# Patient Record
Sex: MALE | Race: BLACK OR AFRICAN AMERICAN | NOT HISPANIC OR LATINO | Employment: FULL TIME | URBAN - METROPOLITAN AREA
[De-identification: names, ages, dates, MRNs, and addresses within clinical notes are randomized per-mention and may not be internally consistent; named-entity substitution may affect disease eponyms.]

---

## 2023-02-23 PROBLEM — R03.0 ELEVATED BLOOD PRESSURE READING: Status: ACTIVE | Noted: 2023-02-23

## 2023-02-23 PROBLEM — E66.3 OVERWEIGHT WITH BODY MASS INDEX (BMI) OF 28 TO 28.9 IN ADULT: Status: ACTIVE | Noted: 2023-02-23

## 2023-02-23 PROBLEM — R21 RASH OF HANDS: Status: ACTIVE | Noted: 2023-02-23

## 2023-02-23 PROBLEM — F17.200 NICOTINE DEPENDENCE: Status: ACTIVE | Noted: 2023-02-23

## 2023-02-23 PROBLEM — E78.5 HYPERLIPIDEMIA: Status: ACTIVE | Noted: 2023-02-23

## 2023-02-23 PROBLEM — I10 BENIGN HYPERTENSION: Status: ACTIVE | Noted: 2023-02-23

## 2023-02-23 PROBLEM — L40.9 PSORIASIS: Status: ACTIVE | Noted: 2023-02-23

## 2023-02-23 RX ORDER — AMLODIPINE BESYLATE 5 MG/1
1 TABLET ORAL DAILY
COMMUNITY

## 2023-02-23 RX ORDER — NICOTINE POLACRILEX 4 MG/1
1 LOZENGE ORAL
COMMUNITY

## 2023-02-23 RX ORDER — ATORVASTATIN CALCIUM 40 MG/1
1 TABLET, FILM COATED ORAL NIGHTLY
COMMUNITY
Start: 2021-09-03

## 2023-02-23 RX ORDER — TRIAMCINOLONE ACETONIDE OINTMENT USP, 0.05% 0.5 MG/G
OINTMENT TOPICAL 2 TIMES DAILY
COMMUNITY
Start: 2022-09-30

## 2023-04-07 ENCOUNTER — APPOINTMENT (OUTPATIENT)
Dept: PRIMARY CARE | Facility: CLINIC | Age: 58
End: 2023-04-07
Payer: COMMERCIAL

## 2024-10-14 ENCOUNTER — LAB (OUTPATIENT)
Dept: LAB | Facility: LAB | Age: 59
End: 2024-10-14
Payer: COMMERCIAL

## 2024-10-14 ENCOUNTER — APPOINTMENT (OUTPATIENT)
Dept: PRIMARY CARE | Facility: CLINIC | Age: 59
End: 2024-10-14
Payer: COMMERCIAL

## 2024-10-14 VITALS
SYSTOLIC BLOOD PRESSURE: 131 MMHG | OXYGEN SATURATION: 95 % | TEMPERATURE: 98.1 F | DIASTOLIC BLOOD PRESSURE: 84 MMHG | RESPIRATION RATE: 18 BRPM | WEIGHT: 228.9 LBS | BODY MASS INDEX: 30.34 KG/M2 | HEIGHT: 73 IN | HEART RATE: 67 BPM

## 2024-10-14 DIAGNOSIS — E66.811 CLASS 1 OBESITY WITHOUT SERIOUS COMORBIDITY WITH BODY MASS INDEX (BMI) OF 30.0 TO 30.9 IN ADULT, UNSPECIFIED OBESITY TYPE: ICD-10-CM

## 2024-10-14 DIAGNOSIS — I10 BENIGN HYPERTENSION: ICD-10-CM

## 2024-10-14 DIAGNOSIS — Z78.9 ALCOHOL USE: ICD-10-CM

## 2024-10-14 DIAGNOSIS — Z23 ENCOUNTER FOR IMMUNIZATION: Primary | ICD-10-CM

## 2024-10-14 DIAGNOSIS — Z00.00 HEALTHCARE MAINTENANCE: ICD-10-CM

## 2024-10-14 DIAGNOSIS — B00.9 HSV-2 (HERPES SIMPLEX VIRUS 2) INFECTION: ICD-10-CM

## 2024-10-14 LAB
25(OH)D3 SERPL-MCNC: 6 NG/ML (ref 30–100)
ALBUMIN SERPL BCP-MCNC: 4.6 G/DL (ref 3.4–5)
ALP SERPL-CCNC: 64 U/L (ref 33–120)
ALT SERPL W P-5'-P-CCNC: 20 U/L (ref 10–52)
ANION GAP SERPL CALC-SCNC: 12 MMOL/L (ref 10–20)
AST SERPL W P-5'-P-CCNC: 18 U/L (ref 9–39)
BASOPHILS # BLD AUTO: 0.06 X10*3/UL (ref 0–0.1)
BASOPHILS NFR BLD AUTO: 0.6 %
BILIRUB SERPL-MCNC: 0.7 MG/DL (ref 0–1.2)
BUN SERPL-MCNC: 20 MG/DL (ref 6–23)
CALCIUM SERPL-MCNC: 9.2 MG/DL (ref 8.6–10.6)
CHLORIDE SERPL-SCNC: 103 MMOL/L (ref 98–107)
CHOLEST SERPL-MCNC: 254 MG/DL (ref 0–199)
CHOLESTEROL/HDL RATIO: 4.4
CO2 SERPL-SCNC: 27 MMOL/L (ref 21–32)
CREAT SERPL-MCNC: 1.13 MG/DL (ref 0.5–1.3)
EGFRCR SERPLBLD CKD-EPI 2021: 75 ML/MIN/1.73M*2
EOSINOPHIL # BLD AUTO: 0.18 X10*3/UL (ref 0–0.7)
EOSINOPHIL NFR BLD AUTO: 1.9 %
ERYTHROCYTE [DISTWIDTH] IN BLOOD BY AUTOMATED COUNT: 12.9 % (ref 11.5–14.5)
EST. AVERAGE GLUCOSE BLD GHB EST-MCNC: 108 MG/DL
FOLATE SERPL-MCNC: 17.5 NG/ML
GGT SERPL-CCNC: 23 U/L (ref 5–64)
GLUCOSE SERPL-MCNC: 86 MG/DL (ref 74–99)
HBA1C MFR BLD: 5.4 %
HBV SURFACE AB SER-ACNC: <3.1 MIU/ML
HBV SURFACE AG SERPL QL IA: NONREACTIVE
HCT VFR BLD AUTO: 47.2 % (ref 41–52)
HDLC SERPL-MCNC: 57.4 MG/DL
HGB BLD-MCNC: 15.7 G/DL (ref 13.5–17.5)
IMM GRANULOCYTES # BLD AUTO: 0.03 X10*3/UL (ref 0–0.7)
IMM GRANULOCYTES NFR BLD AUTO: 0.3 % (ref 0–0.9)
LDLC SERPL CALC-MCNC: 176 MG/DL
LYMPHOCYTES # BLD AUTO: 2.76 X10*3/UL (ref 1.2–4.8)
LYMPHOCYTES NFR BLD AUTO: 29 %
MCH RBC QN AUTO: 32.2 PG (ref 26–34)
MCHC RBC AUTO-ENTMCNC: 33.3 G/DL (ref 32–36)
MCV RBC AUTO: 97 FL (ref 80–100)
MONOCYTES # BLD AUTO: 1.03 X10*3/UL (ref 0.1–1)
MONOCYTES NFR BLD AUTO: 10.8 %
NEUTROPHILS # BLD AUTO: 5.47 X10*3/UL (ref 1.2–7.7)
NEUTROPHILS NFR BLD AUTO: 57.4 %
NON HDL CHOLESTEROL: 197 MG/DL (ref 0–149)
NRBC BLD-RTO: 0 /100 WBCS (ref 0–0)
PLATELET # BLD AUTO: 227 X10*3/UL (ref 150–450)
POTASSIUM SERPL-SCNC: 4.2 MMOL/L (ref 3.5–5.3)
PROT SERPL-MCNC: 7.7 G/DL (ref 6.4–8.2)
RBC # BLD AUTO: 4.88 X10*6/UL (ref 4.5–5.9)
SODIUM SERPL-SCNC: 138 MMOL/L (ref 136–145)
TRIGL SERPL-MCNC: 105 MG/DL (ref 0–149)
TSH SERPL-ACNC: 1.97 MIU/L (ref 0.44–3.98)
VIT B12 SERPL-MCNC: 657 PG/ML (ref 211–911)
VLDL: 21 MG/DL (ref 0–40)
WBC # BLD AUTO: 9.5 X10*3/UL (ref 4.4–11.3)

## 2024-10-14 PROCEDURE — 36415 COLL VENOUS BLD VENIPUNCTURE: CPT

## 2024-10-14 PROCEDURE — 82977 ASSAY OF GGT: CPT

## 2024-10-14 PROCEDURE — 80061 LIPID PANEL: CPT

## 2024-10-14 PROCEDURE — 82607 VITAMIN B-12: CPT

## 2024-10-14 PROCEDURE — 84443 ASSAY THYROID STIM HORMONE: CPT

## 2024-10-14 PROCEDURE — 87340 HEPATITIS B SURFACE AG IA: CPT

## 2024-10-14 PROCEDURE — 82746 ASSAY OF FOLIC ACID SERUM: CPT

## 2024-10-14 PROCEDURE — 86706 HEP B SURFACE ANTIBODY: CPT

## 2024-10-14 PROCEDURE — 85025 COMPLETE CBC W/AUTO DIFF WBC: CPT

## 2024-10-14 PROCEDURE — 82306 VITAMIN D 25 HYDROXY: CPT

## 2024-10-14 PROCEDURE — 80053 COMPREHEN METABOLIC PANEL: CPT

## 2024-10-14 PROCEDURE — 83036 HEMOGLOBIN GLYCOSYLATED A1C: CPT

## 2024-10-14 RX ORDER — AMLODIPINE BESYLATE 5 MG/1
5 TABLET ORAL DAILY
Qty: 30 TABLET | Refills: 5 | Status: SHIPPED | OUTPATIENT
Start: 2024-10-14 | End: 2025-04-12

## 2024-10-14 RX ORDER — BISMUTH SUBSALICYLATE 262 MG
1 TABLET,CHEWABLE ORAL DAILY
Qty: 30 TABLET | Refills: 11 | Status: SHIPPED | OUTPATIENT
Start: 2024-10-14 | End: 2025-10-14

## 2024-10-14 RX ORDER — THIAMINE HCL 250 MG
250 TABLET ORAL DAILY
Qty: 30 TABLET | Refills: 11 | Status: SHIPPED | OUTPATIENT
Start: 2024-10-14 | End: 2025-10-14

## 2024-10-14 RX ORDER — VALACYCLOVIR HYDROCHLORIDE 1 G/1
1000 TABLET, FILM COATED ORAL 3 TIMES DAILY
Qty: 21 TABLET | Refills: 1 | Status: SHIPPED | OUTPATIENT
Start: 2024-10-14 | End: 2024-10-28

## 2024-10-14 RX ORDER — ATORVASTATIN CALCIUM 40 MG/1
40 TABLET, FILM COATED ORAL NIGHTLY
Qty: 30 TABLET | Refills: 5 | Status: SHIPPED | OUTPATIENT
Start: 2024-10-14 | End: 2025-04-12

## 2024-10-14 SDOH — ECONOMIC STABILITY: FOOD INSECURITY: WITHIN THE PAST 12 MONTHS, YOU WORRIED THAT YOUR FOOD WOULD RUN OUT BEFORE YOU GOT MONEY TO BUY MORE.: NEVER TRUE

## 2024-10-14 SDOH — ECONOMIC STABILITY: FOOD INSECURITY: WITHIN THE PAST 12 MONTHS, THE FOOD YOU BOUGHT JUST DIDN'T LAST AND YOU DIDN'T HAVE MONEY TO GET MORE.: NEVER TRUE

## 2024-10-14 ASSESSMENT — COLUMBIA-SUICIDE SEVERITY RATING SCALE - C-SSRS
6. HAVE YOU EVER DONE ANYTHING, STARTED TO DO ANYTHING, OR PREPARED TO DO ANYTHING TO END YOUR LIFE?: NO
2. HAVE YOU ACTUALLY HAD ANY THOUGHTS OF KILLING YOURSELF?: NO
1. IN THE PAST MONTH, HAVE YOU WISHED YOU WERE DEAD OR WISHED YOU COULD GO TO SLEEP AND NOT WAKE UP?: NO

## 2024-10-14 ASSESSMENT — PATIENT HEALTH QUESTIONNAIRE - PHQ9
1. LITTLE INTEREST OR PLEASURE IN DOING THINGS: NOT AT ALL
2. FEELING DOWN, DEPRESSED OR HOPELESS: NOT AT ALL
SUM OF ALL RESPONSES TO PHQ9 QUESTIONS 1 AND 2: 0

## 2024-10-14 ASSESSMENT — PAIN SCALES - GENERAL: PAINLEVEL: 0-NO PAIN

## 2024-10-14 ASSESSMENT — ENCOUNTER SYMPTOMS
DEPRESSION: 0
LOSS OF SENSATION IN FEET: 0
OCCASIONAL FEELINGS OF UNSTEADINESS: 0

## 2024-10-14 NOTE — PATIENT INSTRUCTIONS
- Please go to the lab to get your blood drawn. I will comment   on the results on my chart and give you a call if there are any plan changes  - Please call and schedule an appointment for colonoscopy [phone]   - Please take your blood pressure and cholesterol pills every day  - Please decrease alcohol intake to 1-2 glasses per day  - Please start taking a daily multivitamin and B1 supplement  - Whenever you have a herpes flare, start taking Valtrex 1000 mg 3 times a day for 7 days then stop, this will help clear the infection sooner.  - You only need to abstain from intercourse during an active flare  - You can review the reading handouts attached for more information on things we discussed  - Please follow-up with me in 4-6 months

## 2024-10-14 NOTE — PROGRESS NOTES
Subjective   Patient ID: Esteban Rosario is a 59 y.o. male with PMH of htn, hld, hsvII who presents for Establish Care (Npv, physical ) and Med Refill.    #Alcohol use  Patient drinks 24 hours and 4 shots every day. Reports he has cut down from about half. Patient has been drinking this heavy since his 20s. Reports he does it to relax. Father passed away from cirrhosis of the liver.    #Nicotine Dependence  Smokes 0.25 packs per day. Motivated to quit by birthday, motivated by smell.     #HSV II  Patient reports diagnosed with herpes 2 in his 20s. Last flareup in May, has been abstaining from sex for 5 years because of his symptoms.     # Health Maintenance  - Last prior HM: 02/03/2021  - Patient's rating of their own health: Good  - Dental Care: last prior dental visit more than a year ago // brushes teeth yes // flosses teeth no // denies current tooth pain  - Vision: last prior ophtho visit more a year ago // corrective devices: reading glasses // recent vision: unknown  - Hearing: denies recent hearing loss =  - Diet: tastes foodat work as a , breakfast: bagel and cream cheese, lunch: pasta salad egg salad, chicken sandwich, dinner: chicken paprikash  - Exercise: not exercising but planning to start   - Weight: stable,   - Smoking: current smoker 0.25 oack of cigarettes daily, 46 years, 115 py smoking hx  - EtOH: Alcohol Use: Yes, patient drinks alcohol.  - Illicit substances: Current marijuana. blunts  - Employment:   - Living Situation: House, living with sister, recently  with wife.   - Colon CA: last prior screening Colonoscopy not done // family h/o colon ca? No    MEN  - Sexual History: none in last 5 years  - Prior forms of contraception tried: NA  - Content with current contraception? Yes  - Erectile dysfunction? No  - Prostate CA: last prior PSA n/a    Review of Systems   All other systems reviewed and are negative.      Current Outpatient Medications   Medication Instructions  "   amLODIPine (NORVASC) 5 mg, oral, Daily    atorvastatin (LIPITOR) 40 mg, oral, Nightly    multivitamin tablet 1 tablet, oral, Daily    nicotine polacrilex (Commit) 4 mg lozenge 1 lozenge, Mouth/Throat    thiamine (VITAMIN B-1) 250 mg, oral, Daily    triamcinolone acetonide 0.05 % ointment Topical, 2 times daily    valACYclovir (VALTREX) 1,000 mg, oral, 3 times daily       Objective     Vitals: /84 (BP Location: Left arm, Patient Position: Sitting, BP Cuff Size: Adult)   Pulse 67   Temp 36.7 °C (98.1 °F) (Temporal)   Resp 18   Ht 1.854 m (6' 1\")   Wt 104 kg (228 lb 14.4 oz)   SpO2 95%   BMI 30.20 kg/m²      Physical Exam  Vitals reviewed.   Constitutional:       Appearance: Normal appearance. He is normal weight.   HENT:      Head: Normocephalic and atraumatic.      Nose: Nose normal.      Mouth/Throat:      Mouth: Mucous membranes are moist.      Pharynx: Oropharynx is clear.   Eyes:      Extraocular Movements: Extraocular movements intact.      Conjunctiva/sclera: Conjunctivae normal.      Pupils: Pupils are equal, round, and reactive to light.   Cardiovascular:      Rate and Rhythm: Normal rate and regular rhythm.   Pulmonary:      Effort: Pulmonary effort is normal.      Breath sounds: Normal breath sounds.   Abdominal:      General: Abdomen is flat. Bowel sounds are normal.   Musculoskeletal:         General: Normal range of motion.   Skin:     General: Skin is warm.   Neurological:      General: No focal deficit present.      Mental Status: He is alert and oriented to person, place, and time. Mental status is at baseline.   Psychiatric:         Mood and Affect: Mood normal.         Behavior: Behavior normal.         Thought Content: Thought content normal.         Judgment: Judgment normal.         PHQ2:  Over the past 2 weeks, how often have you been bothered by any of the following problems?  Little interest or pleasure in doing things: Not at all  Feeling down, depressed, or hopeless: Not at " all  Patient Health Questionnaire-2 Score: 0    Food insecurity:  Food Insecurity: No Food Insecurity (10/14/2024)    Hunger Vital Sign     Worried About Running Out of Food in the Last Year: Never true     Ran Out of Food in the Last Year: Never true       Assessment/Plan     59 y.o. male with PMH of htn, hld, hsvII who presents for Establish Care (Npv, physical ) and Med Refill.  Overall hemodynamically stable and well-appearing.  Based on assessment and plan below:    # Routine Health Maintenance  IMMUNIZATIONS  Immunization History   Administered Date(s) Administered    Flu vaccine, trivalent, preservative free, age 6 months and greater (Fluarix/Fluzone/Flulaval) 10/14/2024    Influenza, seasonal, injectable 09/30/2022    Tdap vaccine, age 7 year and older (BOOSTRIX, ADACEL) 09/02/2021    Zoster, Unspecified 09/02/2021, 09/30/2022     - Flu vaccine: recommended annually, completed today  - COVID vaccine: recommended completion of primary series and recommended boosters, discussed today, not available in clininc  - Prevnar (PCV20): N/A  - Tdap: next due 2031  - HPV (<45): aged out  - Shingrix (50+): 2 completed  - STI screen: abstinent  - Lifetime HIV, HepC: both negative  - Lipid Panel (35M,45F): completed, reordered today  - DM screening: ordered today  - HTN screening: elevated today, emphasized medication compliance  - Food Insecurity screen: negative  - Depression: PHQ-2 negative  - Tobacco Cessation: counselled  - Last Dental: recommended follow up every 6mo   - Last Eye exam: recommended follow up annually   - Colonoscopy (45-75): ordered today  - AAA screening (65-75M): n/a  - Lung CA screening (50-80, >20py): n/a    #Alcohol Use  ::Currently drinking ~6 drink equivalents daily  ::Worsened after separation with wife  -MI on alcohol today, ammendable to cut out hard liquor, down to 2 drinks of beer per day  -Declined medication or meetings  -Goal cut down date 02/25/2024  -CMP, CBC, B12, Folate  today    #HTN  :: poorly controlled likely iso medication non compliance  ::Not taking medication today  ::BP not at goal <120/80  -Refilled Amlodipine 5mg every day today.   -Encouraged medication compliance    #HLD  ::Not taking medication  -Refilled atorvastatin 40mg every day  -Lipid panel today  -Encouraged medication compliance    HSVII:   ::Infrequent flareups since 20 years old  ::Abstaining from sex d/t fear of spreading virus  -Counselled on contagious window for HSVII, counselled safe sex practices  -START Valacyclovir 1g PRN for flare-ups    Problem List Items Addressed This Visit             ICD-10-CM    Benign hypertension I10    Relevant Medications    amLODIPine (Norvasc) 5 mg tablet     Other Visit Diagnoses         Codes    Encounter for immunization    -  Primary Z23    Relevant Orders    Flu vaccine, trivalent, preservative free, age 6 months and greater (Fluarix/Fluzone/Flulaval) (Completed)    Healthcare maintenance     Z00.00    Relevant Orders    Hemoglobin A1c (Completed)    Lipid panel (Completed)    Hepatitis B surface antibody (Completed)    Hepatitis B surface antigen (Completed)    Colonoscopy Screening; High Risk Patient    Alcohol use     Z78.9    Relevant Medications    multivitamin tablet    thiamine (Vitamin B-1) 250 mg tablet    Other Relevant Orders    Comprehensive metabolic panel (Completed)    Lipid panel (Completed)    CBC and Auto Differential (Completed)    Vitamin B12 (Completed)    Folate (Completed)    Vitamin D 25-Hydroxy,Total (for eval of Vitamin D levels) (Completed)    Gamma GT (Completed)    Class 1 obesity without serious comorbidity with body mass index (BMI) of 30.0 to 30.9 in adult, unspecified obesity type     E66.811, Z68.30    Relevant Medications    valACYclovir (Valtrex) 1 gram tablet    atorvastatin (Lipitor) 40 mg tablet    Other Relevant Orders    Tsh With Reflex To Free T4 If Abnormal (Completed)    HSV-2 (herpes simplex virus 2) infection     B00.9     Relevant Medications    valACYclovir (Valtrex) 1 gram tablet            Attending Supervision: discussed with attending physician (cosigner listed on this note).    RTC in 6 months for follow-up on alcohol use and smoking, or earlier as needed.    Reviewed and approved by CRISTEL MAGALLANES on 10/14/24 at 2:51 PM.    I saw and evaluated the patient. I personally obtained the key and critical portions of the history and physical exam. I reviewed and modified the student's documentation and discussed patient with the medical student. I agree with the above documentation and medical decision making.     Patient seen and discussed with attending physician Dr. Turner    Plan preliminary until cosigned by attending physician.    Cristel Magallanes M.D.  Family Medicine  PGY-2

## 2024-10-18 ENCOUNTER — TELEPHONE (OUTPATIENT)
Dept: PRIMARY CARE | Facility: CLINIC | Age: 59
End: 2024-10-18
Payer: COMMERCIAL

## 2024-10-18 DIAGNOSIS — E55.9 VITAMIN D DEFICIENCY: Primary | ICD-10-CM

## 2024-10-18 RX ORDER — ERGOCALCIFEROL 1.25 MG/1
50000 CAPSULE ORAL WEEKLY
Qty: 12.85 CAPSULE | Refills: 0 | Status: SHIPPED | OUTPATIENT
Start: 2024-10-18 | End: 2025-01-10

## 2024-10-18 NOTE — TELEPHONE ENCOUNTER
Called patient about lab results.   Informed that cholesterol is elevated and would recommend adherence to statin, stopping smoking and alcohol, and lifestyle modifications that we discussed in our visit.  Will plan to repeat lipid panel in 6 months of compliance to statin treatment.    Informed that vitamin D levels are low and will send weekly vitamin D supplementation.    Informed that patient is not immune to hepatitis B and would recommend vaccination at her follow-up visit.

## 2025-06-16 DIAGNOSIS — E66.811 CLASS 1 OBESITY WITHOUT SERIOUS COMORBIDITY WITH BODY MASS INDEX (BMI) OF 30.0 TO 30.9 IN ADULT, UNSPECIFIED OBESITY TYPE: ICD-10-CM

## 2025-06-17 RX ORDER — ATORVASTATIN CALCIUM 40 MG/1
40 TABLET, FILM COATED ORAL NIGHTLY
Qty: 30 TABLET | Refills: 0 | Status: SHIPPED | OUTPATIENT
Start: 2025-06-17 | End: 2025-07-17

## 2025-07-08 ENCOUNTER — APPOINTMENT (OUTPATIENT)
Facility: HOSPITAL | Age: 60
End: 2025-07-08
Payer: COMMERCIAL